# Patient Record
Sex: FEMALE | Race: WHITE | Employment: OTHER | URBAN - METROPOLITAN AREA
[De-identification: names, ages, dates, MRNs, and addresses within clinical notes are randomized per-mention and may not be internally consistent; named-entity substitution may affect disease eponyms.]

---

## 2017-01-26 ENCOUNTER — OFFICE VISIT (OUTPATIENT)
Dept: PSYCHIATRY | Facility: CLINIC | Age: 72
End: 2017-01-26
Attending: CLINICAL NURSE SPECIALIST
Payer: MEDICARE

## 2017-01-26 VITALS — DIASTOLIC BLOOD PRESSURE: 76 MMHG | SYSTOLIC BLOOD PRESSURE: 122 MMHG | HEART RATE: 80 BPM

## 2017-01-26 DIAGNOSIS — F40.01 AGORAPHOBIA WITH PANIC DISORDER: Primary | ICD-10-CM

## 2017-01-26 DIAGNOSIS — F41.1 GAD (GENERALIZED ANXIETY DISORDER): ICD-10-CM

## 2017-01-26 PROCEDURE — 99212 OFFICE O/P EST SF 10 MIN: CPT | Mod: 25,ZF

## 2017-01-26 RX ORDER — CLONAZEPAM 0.5 MG/1
TABLET ORAL
Qty: 45 TABLET | Refills: 0
Start: 2017-01-26 | End: 2017-10-19

## 2017-01-26 RX ORDER — LOSARTAN POTASSIUM 25 MG/1
25 TABLET ORAL
COMMUNITY
Start: 2016-12-01 | End: 2017-08-10

## 2017-01-26 RX ORDER — ESCITALOPRAM OXALATE 10 MG/1
10 TABLET ORAL DAILY
Qty: 90 TABLET | Refills: 0 | Status: SHIPPED | OUTPATIENT
Start: 2017-01-26 | End: 2017-05-15

## 2017-01-26 NOTE — MR AVS SNAPSHOT
After Visit Summary   1/26/2017    Sai Monroe    MRN: [id number]           Patient Information     Date Of Birth          1945        Visit Information        Provider Department      1/26/2017 3:45 PM Patience Walters APRN CNS [facility]        Today's Diagnoses     Agoraphobia with panic disorder    -  1     TRUDY (generalized anxiety disorder)           Care Instructions    PTSD/Trauma Clinicians          Trauma Therapists Previously with the CVT:      CELI Schwartz                     *Adults (18+)  [address]  [place], [place] [address]  Phone: [phone]    CELI Ryder                     *Adults (18+), Elderly  [address], [address]  [place]  [address]  Phone: [phone]    Sparkle Miles, PhD,     [address] [place] [address]  Phone: [phone]    Other Trauma Referrals:    Micaela Scherer, PhD, [facility]          *Adults  [facility]  [address] [place] [address] [phone]  [email or web address]    [facility]       *Children, Adolescents, Adults  [place], Phone: [phone]  [place], Phone: [phone]   [place], Phone: [phone]  [facility]], Phone: [phone]  [facility], Phone: [phone]    [facility]                     *Children, Adolescents, Adults  [address], [address]  [place], [place], [address]  Phone: [phone]    Cyndy Myers, PhD, [place], [place]  [address] the intersection of [address]  Phone: [phone] ext [phone]    [facility] [facility]                       *Adults (18+)  [address]  [place] [address]  Phone: [phone]  CELI Lofton specializes in EMDR and trauma focused CBT.   There also are practitioners who offer Prolonged Exposure and Somatic Experiencing   to help address trauma related issues.     ** [place] [facility]                 *Children, Adolescents,  Adults  [facility]   [address], [address]  [place] [address]  Phone: [phone] ext [phone]  Kelsi Juarez PsyD, LP,   Charo Lamas PsyD, ADRIENNE Garcia MA, ADRIENNE offer Prolonged Exposure.     [facility]  [address] [place] [address], [address]           *Children, Adolescents, [place] [address]  Phone: [phone] / [phone]  Anyone there, including:  Faustina Gutierrez              Follow-ups after your visit        Follow-up notes from your care team     Return in about 6 months (around 7/26/2017).      Your next 10 appointments already scheduled     Jul 26, 2017 10:15 AM   Adult Med Follow UP with JAYJAY Shook CNS   Psychiatry Clinic ([facility])    [place] [id number] [address] [phone]              Who to contact     Please call your clinic at [phone] to:    Ask questions about your health    Make or cancel appointments    Discuss your medicines    Learn about your test results    Speak to your doctor   If you have compliments or concerns about an experience at your clinic, or if you wish to file a complaint, please contact [facility] Patient Relations at [phone] or email us at [email or web address]         Additional Information About Your Visit        [employer] Information     [employer] gives you secure access to your electronic health record. If you see a primary care provider, you can also send messages to your care team and make appointments. If you have questions, please call your primary care clinic.  If you do not have a primary care provider, please call [phone] and they will assist you.      [employer] is an electronic gateway that provides easy, online access to your medical records. With [employer], you can request a clinic appointment, read your test results, renew a prescription or communicate with your care  team.     To access your existing account, please contact your [facility] or call [phone] for assistance.        Care EveryWhere ID     This is your Care EveryWhere ID. This could be used by other organizations to access your [facility] medical records  [id number]        Your Vitals Were     Pulse                   80            Blood Pressure from Last 3 Encounters:   01/26/17 122/76   06/22/16 121/67   12/30/15 122/73    Weight from Last 3 Encounters:   04/27/05 86.637 kg (191 lb)   04/20/05 86.637 kg (191 lb)   02/04/05 87.363 kg (192 lb 9.6 oz)              Today, you had the following     No orders found for display         Where to get your medicines      These medications were sent to [facility] - [place], [place] - [address]  [address] [facility] [facility] [address]     Phone:  [phone]    - escitalopram 10 MG tablet      Some of these will need a paper prescription and others can be bought over the counter.  Ask your nurse if you have questions.     You don't need a prescription for these medications    - clonazePAM 0.5 MG tablet       Primary Care Provider Office Phone # Fax #    Stephan Lamarriccardocleo [phone]       [facility]  [facility] [address]        Thank you!     Thank you for choosing PSYCHIATRY CLINIC  for your care. Our goal is always to provide you with excellent care. Hearing back from our patients is one way we can continue to improve our services. Please take a few minutes to complete the written survey that you may receive in the mail after your visit with us. Thank you!             Your Updated Medication List - Protect others around you: Learn how to safely use, store and throw away your medicines at www.disposemymeds.org.          This list is accurate as of: 1/26/17  4:38 PM.  Always use your most recent med list.                   Brand Name Dispense Instructions for  use    calcium carbonate 500 MG tablet    OS-MAXIMUS 500 mg [place]. [place]     Take 500 mg by mouth 2 times daily       clonazePAM 0.5 MG tablet    klonoPIN    45 tablet    Take 1/2 tab by mouth daily as needed for anxiety.       escitalopram 10 MG tablet    LEXAPRO    90 tablet    Take 1 tablet (10 mg) by mouth daily       Fish Oil 500 MG Caps      Take 3 capsules (1,500 mg) by mouth daily       glucosamine-chondroitin 500-400 MG Caps per capsule      Take 1 capsule by mouth daily       losartan 25 MG tablet    COZAAR     Take 25 mg by mouth       MELATONIN PO      Take 5 mg by mouth       Multi-vitamin Tabs tablet      Take 1 tablet by mouth daily       VITAMIN D3 PO      Take 1,000 Units by mouth daily       ZOCOR 10 MG tablet   Generic drug:  simvastatin      Take 20 mg by mouth daily

## 2017-01-26 NOTE — NURSING NOTE
Chief Complaint   Patient presents with     RECHECK     Agoraphobia with panic disorder     Reviewed allergies, smoking status, and pharmacy preference  Administered abuse screening questions   Obtained blood pressure and heart rate,declined weight.  Liz Caro LPN

## 2017-01-26 NOTE — PROGRESS NOTES
Outpatient Psychiatry Progress Note     Provider: JAYJAY Shook CNS  Date: 2017  Service:  Medication follow up with counseling.   Patient Identification: Sia Monroe  : 1945   MRN: [id number]    Sia Monroe is a 71 year old year old female who presents for ongoing psychiatric care.  Sia Monroe was last seen in clinic on 16.   At that time,   Assessment & Plan       Sia Monroe is seen today for follow up and reports her mood has been stable. Has had stress with job changes but happy with what she is doing now.    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  She likes her job there.   Her mood has been stable. Has not had full panic but has times of body sensations of panic.  Hasn't been able to look into therapy.   Thinking about turning to 72 and what she wants to do as far as work.  Would like to consider traveling.  Panic is around travel which started at age 28 when she joined the .  Her dog  in July which was difficult. Her dog was comforting when she travelled.  Side effects of medication include: vivid dreams  Psychiatric Review of Systems:  The patient endorses symptoms of depression: In the last 2 weeks denies  She  patient endorses symptoms of anxiety : stable as long as stays in her comfort zone - see subjective.  She endorses symptoms of damaso including none.    She endorses symptoms of psychosis including no psychotic symptoms.       Review of Medical Systems:  Sleep: no  concerns  Energy: stable  Concentration: stable  Appetite: stable  GI Concerns: none  Cardiac concerns: none  Neurological concerns: none  Other medical concerns: no new concerns  Current Substance Use:  Alcohol:denies  Other drugs:denies  Caffeine:not discussed  Nicotine: none  Past Medical History:   Past Medical History   Diagnosis Date     Depressive disorder, not elsewhere classified      Headache(784.0)      Patient Active Problem List   Diagnosis     Agoraphobia with panic disorder     TRUDY (generalized anxiety disorder)       Allergies:   Allergies   Allergen Reactions     No Known Drug Allergies      Perfume      Latex Rash          Current Medications     Current Outpatient Prescriptions Ordered in [facility]   Medication Sig Dispense Refill     escitalopram (LEXAPRO) 10 MG tablet Take 1 tablet (10 mg) by mouth daily 90 tablet 0     clonazePAM (KLONOPIN) 0.5 MG tablet Take 1/2 tab by mouth daily as needed for anxiety. 45 tablet 0     UNKNOWN MED DOSAGE Bitter Kate 900mg       Omega-3 Fatty Acids (FISH OIL) 500 MG CAPS Take 3 capsules (1,500 mg) by mouth daily       multivitamin, therapeutic with minerals (MULTI-VITAMIN) TABS Take 1 tablet by mouth daily       MELATONIN PO Take 5 mg by mouth       calcium carbonate (OS-MAXIMUS 500 MG [place]. [place]) 500 MG tablet Take 500 mg by mouth 2 times daily       Cholecalciferol (VITAMIN D3 PO) Take 1,000 Units by mouth daily       glucosamine-chondroitin 500-400 MG CAPS Take 1 capsule by mouth daily       hydrochlorothiazide 12.5 MG TABS Take 25 mg by mouth daily       simvastatin (ZOCOR) 10 MG tablet Take 20 mg by mouth daily       No current [facility]-ordered facility-administered medications on file.        Mental Status Exam     Appearance:  Casually dressed and Well groomed  Behavior/relationship to examiner/demeanor:  Cooperative  Orientation: Oriented to person, place, time and situation  Psychomotor: normal form  Speech Rate:  Normal  Speech Spontaneity:  Normal  Mood:   "\"okay\"  Affect:  Appropriate/mood-congruent  Thought Process (Associations):  Goal directed  Thought Content:  no overt psychosis, denies suicidal ideation, intent or thoughts and patient does not appear to be responding to internal stimuli  Abnormal Perception:  None  Attention/Concentration:  Normal  Language:  Intact  Insight:  Good  Judgment:  Good      Results     Vital signs: /76 mmHg  Pulse 80    Laboratory Data:  no new data    Assessment & Plan      Sia Monroe is seen today for follow up and reports her mood has been stable and would like to continue current medications. Reports that she continues to have fear of traveling which started when after she had been in the  and had panic attacks during training. Discussed considering further therapy or Augmentin with medication since when on Effexor she was able to travel easier. Also had been able to travel with her pet dog that has passed away.    Diagnosis  Axis 1: TRUDY, Agoraphobia with panic  Axis 2: none  Axis 3: See problem list in the medical record    Plan:  Medication: Continue current medications  OTC Recommendations: none  Lab Orders:  none  Referrals: none  Release of Information: none  Future Treatment Considerations: augmentation due to fear of traveling  Return for Follow Up: 6 months   The risks, benefits, alternatives and side effects have been discussed and are understood by the patient. The patient understands the risks of using street drugs or alcohol. There are no medical contraindications, the patient agrees to treatment, and has the capacity to do so. The patient understands to call 911 or come to the nearest ED if life threatening or urgent symptoms present.  Over 50% of this time was spent counseling the patient and/or coordinating care regarding review of social and occupational functioning.  In addition patient was counseled on health and wellness practices to augment medication treatment of symptoms. See note for " details.    Patience Walters, APRN CNS 1/26/2017

## 2017-01-26 NOTE — PATIENT INSTRUCTIONS
PTSD/Trauma Clinicians          Trauma Therapists Previously with the CVT:      REBECCA SchwartzSW                     *Adults (18+)  [address]  [place], [place] [address]  Phone: [phone]    Mirlande oMra [facility]                     *Adults (18+), Elderly  [address]  [place]  [address]  Phone: [phone]    Sparkle Miles, PhD,     [address] [place] [address]  Phone: [phone]    Other Trauma Referrals:    Micaela Scherer, PhD, [facility], LP          *Adults  [facility]  [address] [place], [place] [address] [phone]  [email or web address]    [facility]       *Children, Adolescents, Adults  [place], Phone: [phone]  [place], Phone: [phone]   [place], Phone: [phone]  [facility], Phone: [phone]  [facility], Phone: [phone]    [facility]                     *Children, Adolescents, Adults  [address], [address]  [place], [place], [address]  Phone: [phone]    Cyndy Myers, PhD, [place], [place]  [address] the intersection of [address]  Phone: [phone] ext [phone]    [facility]                       *Adults (18+)  [address]  [place] [address]  Phone: [phone]  Helene Matias [facility] specializes in EMDR and trauma focused CBT.   There also are practitioners who offer Prolonged Exposure and Somatic Experiencing   to help address trauma related issues.     ** [place] [facility]                 *Children, Adolescents, Adults  [facility]   [address]  [place] [address]  Phone: [phone] ext [phone]  Kelsi Juarez PsyD, ADRIENNE,   Charo Lamas PsyD, ADRIENNE Garcia MA, LP offer Prolonged Exposure.     [facility]  [address]           *Children, Adolescents, [place] [address]  Phone: [phone] / [phone]  Anyone there,  including:  Faustina Gutierrez

## 2017-01-27 NOTE — TELEPHONE ENCOUNTER
Per request of Patience Walters, CNS, APRN a prescription for Klonopin is phoned to [facility] on [address] as outlined in med tab.  Refill information left on pharmacy vm as they are currently closed.

## 2017-05-15 DIAGNOSIS — F40.01 AGORAPHOBIA WITH PANIC DISORDER: ICD-10-CM

## 2017-05-15 RX ORDER — ESCITALOPRAM OXALATE 10 MG/1
10 TABLET ORAL DAILY
Qty: 90 TABLET | Refills: 0 | Status: SHIPPED | OUTPATIENT
Start: 2017-05-15 | End: 2017-08-10

## 2017-05-15 NOTE — TELEPHONE ENCOUNTER
Medication Requested: Lexapro 10 mg tabs  Last Written RX Date: 1-26-17  Last Pharmacy Fill Date: 2-9-17  Last RX Quantity: 90,   # refills: 0    Last Office Visit: 1-26-17  Recommended RTC: 6 mo  Next Scheduled Office Visit: 7-26-17    Since last Visit: # of CX 0 ,# of NOS 0    Last Visit Recommendations for:  Medications: continue  Labs: 0    Will refill 90 day supply per protocol.      Kathleen M Doege RN

## 2017-07-01 ENCOUNTER — HEALTH MAINTENANCE LETTER (OUTPATIENT)
Age: 72
End: 2017-07-01

## 2017-08-10 ENCOUNTER — OFFICE VISIT (OUTPATIENT)
Dept: PSYCHIATRY | Facility: CLINIC | Age: 72
End: 2017-08-10
Attending: CLINICAL NURSE SPECIALIST
Payer: MEDICARE

## 2017-08-10 VITALS — HEART RATE: 70 BPM | SYSTOLIC BLOOD PRESSURE: 127 MMHG | DIASTOLIC BLOOD PRESSURE: 76 MMHG

## 2017-08-10 DIAGNOSIS — F41.1 GAD (GENERALIZED ANXIETY DISORDER): ICD-10-CM

## 2017-08-10 DIAGNOSIS — F40.01 AGORAPHOBIA WITH PANIC DISORDER: Primary | ICD-10-CM

## 2017-08-10 PROCEDURE — 99212 OFFICE O/P EST SF 10 MIN: CPT | Mod: ZF

## 2017-08-10 RX ORDER — PHENOL 1.4 %
10 AEROSOL, SPRAY (ML) MUCOUS MEMBRANE AT BEDTIME
COMMUNITY
Start: 2017-08-10

## 2017-08-10 RX ORDER — SIMVASTATIN 20 MG
20 TABLET ORAL
COMMUNITY
Start: 2017-08-07

## 2017-08-10 RX ORDER — ESCITALOPRAM OXALATE 10 MG/1
10 TABLET ORAL DAILY
Qty: 90 TABLET | Refills: 1 | Status: SHIPPED | OUTPATIENT
Start: 2017-08-10 | End: 2018-02-08

## 2017-08-10 RX ORDER — NITROFURANTOIN 25; 75 MG/1; MG/1
100 CAPSULE ORAL
COMMUNITY
Start: 2017-08-09 | End: 2017-08-16

## 2017-08-10 RX ORDER — ASPIRIN 81 MG/1
81 TABLET ORAL
COMMUNITY
Start: 2015-11-25

## 2017-08-10 RX ORDER — CHOLECALCIFEROL (VITAMIN D3) 50 MCG
2000 TABLET ORAL DAILY
COMMUNITY
Start: 2017-08-10

## 2017-08-10 RX ORDER — TRAMADOL HYDROCHLORIDE 50 MG/1
50 TABLET ORAL
COMMUNITY
Start: 2017-05-02

## 2017-08-10 RX ORDER — LISINOPRIL 5 MG/1
5 TABLET ORAL
COMMUNITY
Start: 2016-02-10

## 2017-08-10 RX ORDER — ACETAMINOPHEN 500 MG
1000 TABLET ORAL
COMMUNITY
Start: 2017-05-02

## 2017-08-10 NOTE — PROGRESS NOTES
Outpatient Psychiatry Progress Note     Provider: JAYJAY Shook CNS  Date: 8/10/2017  Service:  Medication follow up with counseling.   Patient Identification: Sia Monroe  : 1945   MRN: [id number]    Sia Monroe is a 71 year old year old female who presents for ongoing psychiatric care.  Sia Monroe was last seen in clinic on 17.   At that time,   Assessment & Plan       Sia Monroe is seen today for follow up and reports her mood has been stable and would like to continue current medications. Reports that she continues to have fear of traveling which started when after she had been in the  and had panic attacks during training. Discussed considering further therapy or Augmentin with medication since when on Effexor she was able to travel easier. Also had been able to travel with her pet dog that has passed away.     Diagnosis  Axis 1: TRUDY, Agoraphobia with panic  Axis 2: none  Axis 3: See problem list in the medical record     Plan:  Medication: Continue current medications  OTC Recommendations: none  Lab Orders:  none  Referrals: none  Release of Information: none  Future Treatment Considerations: augmentation due to fear of traveling  Return for Follow Up: 6 months    ____________________________________________________________________________________________________________________________________________    08/10/2017  Today Sia reports she has been ill, had a UTI 4 weeks ago, then came down with sore throat, achey, fever and ended up finding out after 3 weeks that she had another UTI.  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   Race walks for exercise  Side effects of medication include: none reported  Psychiatric Review of Systems:  The patient endorses symptoms of depression: In the last 2 weeks per PHQ 9 score of zero.  She  patient endorses symptoms of anxiety : stable rarely taking Klonopin  She endorses symptoms  of damaso including none.    She endorses symptoms of psychosis including no psychotic symptoms.       Review of Medical Systems:  Sleep: stable  Energy: stable  Concentration: stable  Appetite: stable  GI Concerns: none  Cardiac concerns: none  Neurological concerns: none  Other medical concerns: no new concerns  Current Substance Use:  Alcohol:denies frequent use or abuse  Other drugs:none  Caffeine:no change  Nicotine: not reviewed  Past Medical History:   Past Medical History:   Diagnosis Date     Depressive disorder, not elsewhere classified      Headache(784.0)      Patient Active Problem List   Diagnosis     Agoraphobia with panic disorder     TRUDY (generalized anxiety disorder)       Allergies:   Allergies   Allergen Reactions     No Known Drug Allergies      Perfume      Latex Rash          Current Medications     Current Outpatient Prescriptions Ordered in [facility]   Medication Sig Dispense Refill     escitalopram (LEXAPRO) 10 MG tablet Take 1 tablet (10 mg) by mouth daily 90 tablet 0     losartan (COZAAR) 25 MG tablet Take 25 mg by mouth       clonazePAM (KLONOPIN) 0.5 MG tablet Take 1/2 tab by mouth daily as needed for anxiety. 45 tablet 0     Omega-3 Fatty Acids (FISH OIL) 500 MG CAPS Take 3 capsules (1,500 mg) by mouth daily       multivitamin, therapeutic with minerals (MULTI-VITAMIN) TABS Take 1 tablet by mouth daily       MELATONIN PO Take 5 mg by mouth       calcium carbonate (OS-MAXIMUS 500 MG [place]. [place]) 500 MG tablet Take 500 mg by mouth 2 times daily       Cholecalciferol (VITAMIN D3 PO) Take 1,000 Units by mouth daily       glucosamine-chondroitin 500-400 MG CAPS Take 1 capsule by mouth daily       simvastatin (ZOCOR) 10 MG tablet Take 20 mg by mouth daily       No current [facility]-ordered facility-administered medications on file.         Mental Status Exam     Appearance:  Casually dressed and Well groomed  Behavior/relationship to examiner/demeanor:  Cooperative  Orientation: Oriented to person, place,  "time and situation  Psychomotor: normal form  Speech Rate:  Normal  Speech Spontaneity:  Normal  Mood:  \"good\"  Affect:  Appropriate/mood-congruent  Thought Process (Associations):  Goal directed  Thought Content:  no overt psychosis, denies suicidal ideation, intent or thoughts and patient does not appear to be responding to internal stimuli  Abnormal Perception:  None  Attention/Concentration:  Normal  Language:  Intact  Insight:  Good  Judgment:  Good      Results     Vital signs: /76  Pulse 70    Laboratory Data:  no new data    Assessment & Plan      Sia Monroe is seen today for follow up and reports overall her mood has been stable and she would like to continue current medications.    Diagnosis  Axis 1: Panic with Agoraphobia, TRUDY  Axis 2: none  Axis 3: See problem list in the medical record    Plan:  Medication: Continue Lexapro 10mg, prn Klonopin  OTC Recommendations: none  Lab Orders:  none  Referrals: none  Release of Information: none  Future Treatment Considerations:per symptoms  Return for Follow Up:6 months   The risks, benefits, alternatives and side effects have been discussed and are understood by the patient. The patient understands the risks of using street drugs or alcohol. There are no medical contraindications, the patient agrees to treatment, and has the capacity to do so. The patient understands to call 911 or come to the nearest ED if life threatening or urgent symptoms present.  Over 50% of this time was spent counseling the patient and/or coordinating care regarding review of social and occupational functioning.  In addition patient was counseled on health and wellness practices to augment medication treatment of symptoms. See note for details.    Patience Walters, JAYJAY CNS 8/10/2017   "

## 2017-08-10 NOTE — NURSING NOTE
Chief Complaint   Patient presents with     Recheck Medication     Agoraphobia with panic disorder      Reviewed allergies, smoking status, and pharmacy preference  Administered abuse screening question   Obtained weight, blood pressure and heart rate

## 2017-08-10 NOTE — MR AVS SNAPSHOT
After Visit Summary   8/10/2017    Sia Monroe    MRN: [id number]           Patient Information     Date Of Birth          1945        Visit Information        Provider Department      8/10/2017 8:45 AM Patience Walters APRN [facility]        Today's Diagnoses     Agoraphobia with panic disorder    -  1    TRUDY (generalized anxiety disorder)           Follow-ups after your visit        Follow-up notes from your care team     Return in about 6 months (around 2/10/2018).      Your next 10 appointments already scheduled     Feb 08, 2018  8:45 AM CST   Adult Med Follow UP with JAYJAY Shook CNS   Psychiatry Clinic ([facility])    [address] [facility] [address] [phone]              Who to contact     Please call your clinic at [phone] to:    Ask questions about your health    Make or cancel appointments    Discuss your medicines    Learn about your test results    Speak to your doctor   If you have compliments or concerns about an experience at your clinic, or if you wish to file a complaint, please contact [facility] Patient Relations at [phone] or email us at [email or web address]         Additional Information About Your Visit        MyChart Information     [email or web address] gives you secure access to your electronic health record. If you see a primary care provider, you can also send messages to your care team and make appointments. If you have questions, please call your primary care clinic.  If you do not have a primary care provider, please call [phone] and they will assist you.      [email or web address] is an electronic gateway that provides easy, online access to your medical records. With [email or web address], you can request a clinic appointment, read your test results, renew a prescription or communicate with your care team.     To access your existing account, please  contact your [facility] or call [phone] for assistance.        Care EveryWhere ID     This is your Care EveryWhere ID. This could be used by other organizations to access your [place] medical records  [id number]        Your Vitals Were     Pulse                   70            Blood Pressure from Last 3 Encounters:   08/10/17 127/76   01/26/17 122/76   06/22/16 121/67    Weight from Last 3 Encounters:   04/27/05 86.6 kg (191 lb)   04/20/05 86.6 kg (191 lb)   02/04/05 87.4 kg (192 lb 9.6 oz)              Today, you had the following     No orders found for display         Today's Medication Changes          These changes are accurate as of: 8/10/17 11:59 PM.  If you have any questions, ask your nurse or doctor.               These medicines have changed or have updated prescriptions.        Dose/Directions    * cholecalciferol 1000 UNIT tablet   Commonly known as:  vitamin D   This may have changed:  Another medication with the same name was changed. Make sure you understand how and when to take each.   Changed by:  Patience Walters APRN CNS        Refills:  0       * Vitamin D3 2000 UNITS Tabs   This may have changed:    - medication strength  - how much to take   Changed by:  Patience Walters APRN CNS        Dose:  2000 Units   Take 2,000 Units by mouth daily   Refills:  0       Melatonin 10 MG Tabs tablet   This may have changed:    - medication strength  - how much to take  - when to take this   Changed by:  Patience Walters APRN CNS        Dose:  10 mg   Take 1 tablet (10 mg) by mouth At Bedtime   Refills:  0       * Notice:  This list has 2 medication(s) that are the same as other medications prescribed for you. Read the directions carefully, and ask your doctor or other care provider to review them with you.         Where to get your medicines      These medications were sent to [facility] - [place], [place] - [address]    [address] [address]     Phone:  [phone]     escitalopram 10 MG tablet                Primary Care Provider Office Phone # Fax #    Stephan Voss [phone]       [facility]  [address]  [facility] [address]        Equal Access to Services     KELSEY MIRELES : Hadii aad ku hadasho Soomaali, waaxda luqadaha, qaybta kaalmada adeegyada, waxay idiin hayaan adeeg khkarolsh larosi . So [facility] [phone].    ATENCIÓN: Si habla español, tiene a barragan disposición servicios gratuitos de asistencia lingüística. Kayla al [phone].    We comply with applicable federal civil rights laws and [place] laws. We do not discriminate on the basis of race, color, national origin, age, disability sex, sexual orientation or gender identity.            Thank you!     Thank you for choosing PSY[facility]ATRY CLINIC  for your care. Our goal is always to provide you with excellent care. Hearing back from our patients is one way we can continue to improve our services. Please take a few minutes to complete the written survey that you may receive in the mail after your visit with us. Thank you!             Your Updated Medication List - Protect others around you: Learn how to safely use, store and throw away your medicines at www.disposemymeds.org.          This list is accurate as of: 8/10/17 11:59 PM.  Always use your most recent med list.                   Brand Name Dispense Instructions for use Diagnosis    acetaminophen 500 MG tablet    TYLENOL     Take 1,000 mg by mouth        aspirin EC 81 MG EC tablet      Take 81 mg by mouth        calcium carbonate 1250 MG tablet    OS-MAXIMUS 500 mg [place]. Ca     Take 500 mg by mouth 2 times daily        * cholecalciferol 1000 UNIT tablet    vitamin D          * Vitamin D3 2000 UNITS Tabs      Take 2,000 Units by mouth daily        clonazePAM 0.5 MG tablet    klonoPIN    45 tablet    Take 1/2 tab by mouth daily as needed for anxiety.     Agoraphobia with panic disorder, TRUDY (generalized anxiety disorder)       escitalopram 10 MG tablet    LEXAPRO    90 tablet    Take 1 tablet (10 mg) by mouth daily    Agoraphobia with panic disorder       Fish Oil 500 MG Caps      Take 3 capsules (1,500 mg) by mouth daily        glucosamine-chondroitin 500-400 MG Caps per capsule      Take 1 capsule by mouth daily        lisinopril 5 MG tablet    PRINIVIL/ZESTRIL     Take 5 mg by mouth        Melatonin 10 MG Tabs tablet      Take 1 tablet (10 mg) by mouth At Bedtime        Multi-vitamin Tabs tablet      Take 1 tablet by mouth daily        nitroFURantoin (macrocrystal-monohydrate) 100 MG capsule    MACROBID     Take 100 mg by mouth        simvastatin 20 MG tablet    ZOCOR     Take 20 mg by mouth        traMADol 50 MG tablet    ULTRAM     Take 50 mg by mouth        * Notice:  This list has 2 medication(s) that are the same as other medications prescribed for you. Read the directions carefully, and ask your doctor or other care provider to review them with you.

## 2017-10-19 DIAGNOSIS — F41.1 GAD (GENERALIZED ANXIETY DISORDER): ICD-10-CM

## 2017-10-19 DIAGNOSIS — F40.01 AGORAPHOBIA WITH PANIC DISORDER: ICD-10-CM

## 2017-10-19 RX ORDER — CLONAZEPAM 0.5 MG/1
TABLET ORAL
Qty: 45 TABLET | Refills: 0
Start: 2017-10-19 | End: 2018-08-27

## 2018-02-08 ENCOUNTER — OFFICE VISIT (OUTPATIENT)
Dept: PSYCHIATRY | Facility: CLINIC | Age: 73
End: 2018-02-08
Attending: CLINICAL NURSE SPECIALIST
Payer: MEDICARE

## 2018-02-08 VITALS — SYSTOLIC BLOOD PRESSURE: 132 MMHG | DIASTOLIC BLOOD PRESSURE: 82 MMHG | HEART RATE: 73 BPM

## 2018-02-08 DIAGNOSIS — F40.01 AGORAPHOBIA WITH PANIC DISORDER: ICD-10-CM

## 2018-02-08 DIAGNOSIS — F41.1 GAD (GENERALIZED ANXIETY DISORDER): Primary | ICD-10-CM

## 2018-02-08 PROCEDURE — G0463 HOSPITAL OUTPT CLINIC VISIT: HCPCS | Mod: ZF

## 2018-02-08 RX ORDER — ESCITALOPRAM OXALATE 10 MG/1
10 TABLET ORAL DAILY
Qty: 90 TABLET | Refills: 1 | Status: SHIPPED | OUTPATIENT
Start: 2018-02-08 | End: 2018-08-09

## 2018-02-08 ASSESSMENT — PAIN SCALES - GENERAL: PAINLEVEL: MODERATE PAIN (4)

## 2018-02-08 NOTE — PROGRESS NOTES
Outpatient Psychiatry Progress Note     Provider: JAYJAY Shook CNS  Date: 2018  Service:  Medication follow up with counseling.   Patient Identification: Sia Monroe  : 1945   MRN: [id number]    Sia Monroe is a 72 year old year old female who presents for ongoing psychiatric care.  Sia Monroe was last seen in clinic on 8/10/17.   At that time,   Assessment & Plan       Sia Monroe is seen today for follow up and reports overall her mood has been stable and she would like to continue current medications.     Diagnosis  Axis 1: Panic with Agoraphobia, TRUDY  Axis 2: none  Axis 3: See problem list in the medical record     Plan:  Medication: Continue Lexapro 10mg, prn Klonopin  OTC Recommendations: none  Lab Orders:  none  Referrals: none  Release of Information: none  Future Treatment Considerations:per symptoms  Return for Follow Up:6 months      ____________________________________________________________________________________________________________________________________________    2018  Today Sia reports she has been having vivid dreams since she has been on since Lexapro.   It does help with anxiety. She is currently under stress with a women who lives with her that has been homeless for 3 years which is why she is leaving with her.  Sia also has a housemate who is stressed by this women.   Side effects of medication include: vivid dreams.  Psychiatric Review of Systems:  Depression: In the last 2 weeks per PHQ 9 score of zero  Anxiety : see above. Doesn't use Klonopin for anxiety only uses for sleep prn  Nova none   Psychosis  none.   ADHD n/a    Review of Medical Systems:  Sleep: stable uses Klonopin only 1/2 tablet a few times a week when she wakes up during the night.  Energy: stable  Concentration: stable  Appetite: stable  GI Concerns: none  Cardiac concerns: none  Neurological concerns: no new concerns  Other medical concerns: no new  concerns  Current Substance Use:  Alcohol:denies  Other drugs:denies  Caffeine:no change  Nicotine: no change - 2 cigs per day  Past Medical History:   Past Medical History:   Diagnosis Date     Depressive disorder, not elsewhere classified      Headache(784.0)      Patient Active Problem List   Diagnosis     Agoraphobia with panic disorder     TRUDY (generalized anxiety disorder)       Allergies:   Allergies   Allergen Reactions     No Known Drug Allergies      Perfume      Latex Rash          Current Medications     Current Outpatient Prescriptions Ordered in [facility]   Medication Sig Dispense Refill     clonazePAM (KLONOPIN) 0.5 MG tablet Take 1/2 tab by mouth daily as needed for anxiety. 45 tablet 0     acetaminophen (TYLENOL) 500 MG tablet Take 1,000 mg by mouth       aspirin EC 81 MG EC tablet Take 81 mg by mouth       lisinopril (PRINIVIL/ZESTRIL) 5 MG tablet Take 5 mg by mouth       traMADol (ULTRAM) 50 MG tablet Take 50 mg by mouth       simvastatin (ZOCOR) 20 MG tablet Take 20 mg by mouth       cholecalciferol (VITAMIN D3) 1000 UNIT tablet        Cholecalciferol (VITAMIN D3) 2000 UNITS TABS Take 2,000 Units by mouth daily       Melatonin 10 MG TABS tablet Take 1 tablet (10 mg) by mouth At Bedtime       escitalopram (LEXAPRO) 10 MG tablet Take 1 tablet (10 mg) by mouth daily 90 tablet 1     Omega-3 Fatty Acids (FISH OIL) 500 MG CAPS Take 3 capsules (1,500 mg) by mouth daily       multivitamin, therapeutic with minerals (MULTI-VITAMIN) TABS Take 1 tablet by mouth daily       calcium carbonate (OS-MAXIMUS 500 MG [place]. [place]) 500 MG tablet Take 500 mg by mouth 2 times daily       glucosamine-chondroitin 500-400 MG CAPS Take 1 capsule by mouth daily       No current [facility]-ordered facility-administered medications on file.         Mental Status Exam     Appearance:  Casually dressed and Well groomed  Behavior/relationship to examiner/demeanor:  Cooperative  Orientation: Oriented to person, place, time and  "situation  Psychomotor: normal form  Speech Rate:  Normal  Speech Spontaneity:  Normal  Mood:  \"okay\"  Affect:  Appropriate/mood-congruent  Thought Process (Associations):  Goal directed  Thought Content:  no overt psychosis, denies suicidal ideation, intent or thoughts and patient does not appear to be responding to internal stimuli  Abnormal Perception:  None  Attention/Concentration:  Normal  Insight:  Adequate  Judgment:  Adequate for safety      Results     Vital signs: /82  Pulse 73    Laboratory Data:  no new data review    Assessment & Plan      Sia Monroe is seen today for follow up and reports in general her mood is stable but stressed with circumstances around someone she is trying to help.  Although having vivid dreams secondary to Lexapro since she did not tolerate other medications and is sensitive to medications will continue Lexapro 10mg.    Diagnosis   Panic with Agoraphobia, TRUDY      Plan:  Medication: Continue current medications but consider supplements for waking during the night  OTC Recommendations: L theanine, Agustin Nite, consider decreasing Melatonin to 3  To 5 mg.  Lab Orders:  none  Referrals: none  Release of Information: none  Future Treatment Considerations:per symptoms  Return for Follow Up:6 months   The risks, benefits, alternatives and side effects have been discussed and are understood by the patient. The patient understands the risks of using street drugs or alcohol. There are no medical contraindications, the patient agrees to treatment, and has the capacity to do so. The patient understands to call 911 or come to the nearest ED if life threatening or urgent symptoms present.  In addition time was spent counseling the patient and/or coordinating care regarding review of social and occupational functioning.  In addition patient was counseled on health and wellness practices to augment medication treatment of symptoms. See note for details.    Patience Walters, APRN " CNS 2/8/2018

## 2018-02-08 NOTE — PATIENT INSTRUCTIONS
Medication: Continue current medications but consider supplements for waking during the night  OTC Recommendations: L theanine, Mid Nite, consider decreasing Melatonin to 3  To 5 mg.  L-Theanine 200mg  Dose.  Could consider other sleep supplements you find.  Lab Orders:  none  Referrals: none  Release of Information: none  Future Treatment Considerations:per symptoms  Return for Follow Up:6 months    Thank you for coming to the PSYCHIATRY CLINIC.    Lab Testing:  If you had lab testing today and your results are reassuring or normal they will be mailed to you or sent through [employer] within 7 days.   If the lab tests need quick action we will call you with the results.  The phone number we will call with results is # [phone] (home) . If this is not the best number please call our clinic and change the number.    Medication Refills:  If you need any refills please call your pharmacy and they will contact us. Our fax number for refills is [phone]. Please allow three business for refill processing.   If you need to  your refill at a new pharmacy, please contact the new pharmacy directly. The new pharmacy will help you get your medications transferred.     Scheduling:  If you have any concerns about today's visit or wish to schedule another appointment please call our office during normal business hours [phone] (8-5:00 M-F)    Contact Us:  Please call [phone] during business hours (8-5:00 M-F).  If after clinic hours, or on the weekend, please call  [phone].    Financial Assistance [phone]  [facility] [phone]  [facility] [phone]  Medical Records [phone]      MENTAL HEALTH CRISIS NUMBERS:  [facility]:   [facility] - [phone]   Crisis Residence [facility] - [phone]   Walk-In Counseling Center [facility] - [phone]   COPE 24/7 [facility] Mobile Team for Adults - [[phone]]; Child - [[phone]]     Crisis  [facility] - [phone]     [facility] - [phone]   Walk-in counseling [facility] - [phone]   Walk-in counseling [facility] - [phone]   Crisis Residence [facility] - [phone]   Urgent Care Adult Mental Health:   --Drop-in, 24/7 crisis line, and [place] Mobile Team [[phone]]    CRISIS TEXT LINE: Text  from anywhere, anytime, any crisis 24/7;    OR SEE www.crisistextline.org     Poison Control Center - [phone]    CHILD: [facility] needs assessment team - [phone]     [facility] Lifeline - [phone]; or [facility] Lifeline - 1-811.522.8632    If you have a medical emergency please call 911or go to the nearest ER.                    _____________________________________________    Again thank you for choosing PSYCHIATRY CLINIC and please let us know how we can best partner with you to improve you and your family's health.  You may be receiving a survey in the mail regarding this appointment. We would love to have your feedback, both positive and negative, so please fill out the survey and return it using the provided envolpe. The survey is done by an external company, so your answers are anonymous.

## 2018-02-08 NOTE — MR AVS SNAPSHOT
After Visit Summary   2/8/2018    Sia Monroe    MRN: [id number]           Patient Information     Date Of Birth          1945        Visit Information        Provider Department      2/8/2018 8:45 AM Patience Walters APRN [facility]        Today's Diagnoses     TRUDY (generalized anxiety disorder)    -  1    Agoraphobia with panic disorder          Care Instructions    Medication: Continue current medications but consider supplements for waking during the night  OTC Recommendations: L theanine, Mid Nite, consider decreasing Melatonin to 3  To 5 mg.  L-Theanine 200mg  Dose.  Could consider other sleep supplements you find.  Lab Orders:  none  Referrals: none  Release of Information: none  Future Treatment Considerations:per symptoms  Return for Follow Up:6 months    Thank you for coming to the PSYCHIATRY CLINIC.    Lab Testing:  If you had lab testing today and your results are reassuring or normal they will be mailed to you or sent through [employer] within 7 days.   If the lab tests need quick action we will call you with the results.  The phone number we will call with results is # [phone] (home) . If this is not the best number please call our clinic and change the number.    Medication Refills:  If you need any refills please call your pharmacy and they will contact us. Our fax number for refills is [phone]. Please allow three business for refill processing.   If you need to  your refill at a new pharmacy, please contact the new pharmacy directly. The new pharmacy will help you get your medications transferred.     Scheduling:  If you have any concerns about today's visit or wish to schedule another appointment please call our office during normal business hours [phone] (8-5:00 M-F)    Contact Us:  Please call [phone] during business hours (8-5:00 M-F).  If after clinic hours, or on the weekend, please call  [phone].    Financial Assistance  [phone]  [facility] Billing [phone]  [place] Billing [phone]  Medical Records [phone]      MENTAL HEALTH CRISIS NUMBERS:  [facility]:   [facility] - [phone]   Crisis Residence \[facility]\"" [facility] - [phone]   Walk-In Counseling Center \[facility]\"" - [phone]   COPE 24/7 [place] Mobile Team for Adults - [[phone]]; Child - [[phone]]     Crisis Connection - [phone]     [facility]:   [facility] - [phone]   Walk-in counseling [facility] - [phone]   Walk-in counseling [facility] - [phone]   Crisis Residence [facility] - [phone]   Urgent Care Adult Mental Health:   --Drop-in, 24/7 crisis line, and [place] Mobile Team [[phone]]    CRISIS TEXT LINE: Text [phone] from anywhere, anytime, any crisis 24/7;    OR SEE www.crisistextline.org     Poison Control Center - [phone]    CHILD: [facility] needs assessment team - [phone]     [facility] - [phone]; or [facility] - [phone]    If you have a medical emergency please call 911or go to the nearest ER.                    _____________________________________________    Again thank you for choosing PSYCHIATRY CLINIC and please let us know how we can best partner with you to improve you and your family's health.  You may be receiving a survey in the mail regarding this appointment. We would love to have your feedback, both positive and negative, so please fill out the survey and return it using the provided envolpe. The survey is done by an external company, so your answers are anonymous.             Follow-ups after your visit        Follow-up notes from your care team     Return in about 6 months (around 8/8/2018).      Your next 10 appointments already scheduled     Aug 09, 2018  8:45 AM CDT   Adult Med Follow UP with JAYJAY Shook CNS   Psychiatry Clinic  ([facility])    [address]  [address] [facility] [id number] [phone]              Who to contact     Please call your clinic at [phone] to:    Ask questions about your health    Make or cancel appointments    Discuss your medicines    Learn about your test results    Speak to your doctor   If you have compliments or concerns about an experience at your clinic, or if you wish to file a complaint, please contact [facility] Patient Relations at [phone] or email us at [email or web address]         Additional Information About Your Visit        [employer]eg[employer] Information     [employer] gives you secure access to your electronic health record. If you see a primary care provider, you can also send messages to your care team and make appointments. If you have questions, please call your primary care clinic.  If you do not have a primary care provider, please call [phone] and they will assist you.      [employer] is an electronic gateway that provides easy, online access to your medical records. With [employer], you can request a clinic appointment, read your test results, renew a prescription or communicate with your care team.     To access your existing account, please contact your [facility] or call [phone] for assistance.        Care EveryWhere ID     This is your Care EveryWhere ID. This could be used by other organizations to access your [place] medical records  [id number]        Your Vitals Were     Pulse                   73            Blood Pressure from Last 3 Encounters:   02/08/18 132/82   08/10/17 127/76   01/26/17 122/76    Weight from Last 3 Encounters:   04/27/05 86.6 kg (191 lb)   04/20/05 86.6 kg (191 lb)   02/04/05 87.4 kg (192 lb 9.6 oz)              Today, you had the following     No orders found for display         Where to get your medicines      These medications were  sent to [facility] - [place], [place] - [address] [facility] [address]  [address] [facility] [address], [place] [address]     Phone:  [phone]     escitalopram 10 MG tablet          Primary Care Provider Office Phone # Fax #    Stephan Voss [phone]       LALITA [facility] MEDICAL  [address]  [facility] [address]        Equal Access to Services     KELSEY MIRELES : Hadii aad ku hadasho Soomaali, waaxda luqadaha, qaybta kaalmada adeegyada, waxay idiin hayaan adeeg kharash la'aan . So [facility] [phone].    ATENCIÓN: Si habla español, tiene a barragan disposición servicios gratuitos de asistencia lingüística. [facility] [phone].    We comply with applicable federal civil rights laws and [place] laws. We do not discriminate on the basis of race, color, national origin, age, disability, sex, sexual orientation, or gender identity.            Thank you!     Thank you for choosing PSYCHIATRY CLINIC  for your care. Our goal is always to provide you with excellent care. Hearing back from our patients is one way we can continue to improve our services. Please take a few minutes to complete the written survey that you may receive in the mail after your visit with us. Thank you!             Your Updated Medication List - Protect others around you: Learn how to safely use, store and throw away your medicines at www.disposemymeds.org.          This list is accurate as of 2/8/18  9:49 AM.  Always use your most recent med list.                   Brand Name Dispense Instructions for use Diagnosis    acetaminophen 500 MG tablet    TYLENOL     Take 1,000 mg by mouth        aspirin EC 81 MG EC tablet      Take 81 mg by mouth        calcium carbonate 1250 MG tablet    OS-MAXIMUS 500 mg [place]. Ca     Take 500 mg by mouth 2 times daily        * cholecalciferol 1000 UNIT tablet    vitamin D3          * Vitamin D3 2000 UNITS Tabs      Take 2,000 Units by mouth daily        clonazePAM 0.5 MG tablet     klonoPIN    45 tablet    Take 1/2 tab by mouth daily as needed for anxiety.    Agoraphobia with panic disorder, TRUDY (generalized anxiety disorder)       escitalopram 10 MG tablet    LEXAPRO    90 tablet    Take 1 tablet (10 mg) by mouth daily    Agoraphobia with panic disorder       Fish Oil 500 MG Caps      Take 3 capsules (1,500 mg) by mouth daily        glucosamine-chondroitin 500-400 MG Caps per capsule      Take 1 capsule by mouth daily        lisinopril 5 MG tablet    PRINIVIL/ZESTRIL     Take 5 mg by mouth        Melatonin 10 MG Tabs tablet      Take 1 tablet (10 mg) by mouth At Bedtime        Multi-vitamin Tabs tablet      Take 1 tablet by mouth daily        simvastatin 20 MG tablet    ZOCOR     Take 20 mg by mouth        traMADol 50 MG tablet    ULTRAM     Take 50 mg by mouth        * Notice:  This list has 2 medication(s) that are the same as other medications prescribed for you. Read the directions carefully, and ask your doctor or other care provider to review them with you.

## 2018-02-08 NOTE — NURSING NOTE
Chief Complaint   Patient presents with     Recheck Medication     Reviewed allergies, medications, pharmacy and smoking status.    Obtained weight, pain level, blood pressure and heart rate

## 2018-07-07 ENCOUNTER — HEALTH MAINTENANCE LETTER (OUTPATIENT)
Age: 73
End: 2018-07-07

## 2018-08-09 ENCOUNTER — OFFICE VISIT (OUTPATIENT)
Dept: PSYCHIATRY | Facility: CLINIC | Age: 73
End: 2018-08-09
Attending: CLINICAL NURSE SPECIALIST
Payer: MEDICARE

## 2018-08-09 VITALS — HEART RATE: 87 BPM | SYSTOLIC BLOOD PRESSURE: 112 MMHG | DIASTOLIC BLOOD PRESSURE: 61 MMHG

## 2018-08-09 DIAGNOSIS — F41.1 GAD (GENERALIZED ANXIETY DISORDER): Primary | ICD-10-CM

## 2018-08-09 DIAGNOSIS — F40.01 AGORAPHOBIA WITH PANIC DISORDER: ICD-10-CM

## 2018-08-09 PROCEDURE — G0463 HOSPITAL OUTPT CLINIC VISIT: HCPCS | Mod: ZF

## 2018-08-09 RX ORDER — ESCITALOPRAM OXALATE 10 MG/1
10 TABLET ORAL DAILY
Qty: 90 TABLET | Refills: 1 | Status: SHIPPED | OUTPATIENT
Start: 2018-08-09 | End: 2019-02-21

## 2018-08-09 ASSESSMENT — PAIN SCALES - GENERAL: PAINLEVEL: NO PAIN (0)

## 2018-08-09 NOTE — PROGRESS NOTES
Outpatient Psychiatry Progress Note     Provider: JAYJAY Shook CNS  Date: 2018  Service:  Medication follow up with counseling.   Patient Identification: Sia Monroe  : 1945   MRN: [id number]    Sia Monroe is a 72 year old year old female who presents for ongoing psychiatric care.  Sia Monroe was last seen in clinic on 18.   At that time,   Assessment & Plan       Sia Monroe is seen today for follow up and reports in general her mood is stable but stressed with circumstances around someone she is trying to help.  Although having vivid dreams secondary to Lexapro since she did not tolerate other medications and is sensitive to medications will continue Lexapro 10mg.     Diagnosis   Panic with Agoraphobia, TRUDY        Plan:  Medication: Continue current medications but consider supplements for waking during the night  OTC Recommendations: L theanine, Mid Nite, consider decreasing Melatonin to 3  To 5 mg.  Lab Orders:  none  Referrals: none  Release of Information: none  Future Treatment Considerations:per symptoms  Return for Follow Up:6 months      ____________________________________________________________________________________________________________________________________________    2018  Today Sia reports she is feeling good. She felt like she was getting depressed but things that is about trying to figure out what she wants to do. Wants to travel and if not worries that anxiety will also will strong.    The woman who lived with her past away in March passed away so it took a lot of time to clear out the rooms she was using and also had to do all the  arrangements.  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  Trying to meditate but finds this difficult  Side effects of medication include: none  Psychiatric Review of Systems:  Depression: In the last 2 weeks per PHQ 9 score of zero  Anxiety : uses Klonopin once  a week but only for sleep if she wakes up during the night not for anxiety  Nova na   Psychosis  na.   ADHD na    Review of Medical Systems:  Sleep: stable  Energy: stable  Concentration: stable  Appetite: stable  GI Concerns: na  Cardiac concerns: na  Neurological concerns: na  Other medical concerns: na  Current Substance Use:  Alcohol:denies  Other drugs:denies  Caffeine:no change  Nicotine: none  Past Medical History:   Past Medical History:   Diagnosis Date     Depressive disorder, not elsewhere classified      Headache(784.0)      Patient Active Problem List   Diagnosis     Agoraphobia with panic disorder     TRUDY (generalized anxiety disorder)       Allergies:   Allergies   Allergen Reactions     No Known Drug Allergies      Perfume      Latex Rash          Current Medications     Current Outpatient Prescriptions Ordered in [facility]   Medication Sig Dispense Refill     acetaminophen (TYLENOL) 500 MG tablet Take 1,000 mg by mouth       aspirin EC 81 MG EC tablet Take 81 mg by mouth       calcium carbonate (OS-MAXIMUS 500 MG [place]. [place]) 500 MG tablet Take 500 mg by mouth 2 times daily       cholecalciferol (VITAMIN D3) 1000 UNIT tablet        Cholecalciferol (VITAMIN D3) 2000 UNITS TABS Take 2,000 Units by mouth daily       clonazePAM (KLONOPIN) 0.5 MG tablet Take 1/2 tab by mouth daily as needed for anxiety. 45 tablet 0     escitalopram (LEXAPRO) 10 MG tablet Take 1 tablet (10 mg) by mouth daily 90 tablet 1     glucosamine-chondroitin 500-400 MG CAPS Take 1 capsule by mouth daily       lisinopril (PRINIVIL/ZESTRIL) 5 MG tablet Take 5 mg by mouth       Melatonin 10 MG TABS tablet Take 1 tablet (10 mg) by mouth At Bedtime       multivitamin, therapeutic with minerals (MULTI-VITAMIN) TABS Take 1 tablet by mouth daily       Omega-3 Fatty Acids (FISH OIL) 500 MG CAPS Take 3 capsules (1,500 mg) by mouth daily       simvastatin (ZOCOR) 20 MG tablet Take 20 mg by mouth       traMADol (ULTRAM) 50 MG tablet Take 50 mg by mouth    "    No current Epic-ordered facility-administered medications on file.         Mental Status Exam     Appearance:  Casually dressed and Well groomed  Behavior/relationship to examiner/demeanor:  Cooperative  Orientation: Oriented to person, place, time and situation  Psychomotor: normal form  Speech Rate:  Normal  Speech Spontaneity:  Normal  Mood:  \"okay\"  Affect:  Appropriate/mood-congruent  Thought Process (Associations):  Goal directed  Thought Content:  no overt psychosis, denies suicidal ideation, intent or thoughts and patient does not appear to be responding to internal stimuli  Abnormal Perception:  None  Attention/Concentration:  Normal  Insight:  Good  Judgment:  Good      Results     Vital signs: /61  Pulse 87    Laboratory Data:  no new data    Assessment & Plan      Sia Monroe is seen today for follow up and reports her mood has been stable and would to continue current medications.    Diagnosis  Panic with Agoraphobia, TRUDY    Plan:  Medication: no changes  OTC Recommendations: none  Lab Orders:  none  Referrals: none  Release of Information: none  Future Treatment Considerations:per symptoms  Return for Follow Up:6 months   The risks, benefits, alternatives and side effects have been discussed and are understood by the patient. The patient understands the risks of using street drugs or alcohol. There are no medical contraindications, the patient agrees to treatment, and has the capacity to do so. The patient understands to call 911 or come to the nearest ED if life threatening or urgent symptoms present.  In addition time was spent counseling the patient and/or coordinating care regarding review of social and occupational functioning.  In addition patient was counseled on health and wellness practices to augment medication treatment of symptoms. See note for details.    Patience Walters, APRN CNS 8/9/2018   "

## 2018-08-09 NOTE — MR AVS SNAPSHOT
After Visit Summary   8/9/2018    Sia Monroe    MRN: [id number]           Patient Information     Date Of Birth          1945        Visit Information        Provider Department      8/9/2018 8:45 AM Patience Walters APRN [facility] Psychiatry Clinic        Today's Diagnoses     TRUDY (generalized anxiety disorder)    -  1    Agoraphobia with panic disorder           Follow-ups after your visit        Follow-up notes from your care team     Return in about 6 months (around 2/9/2019).      Who to contact     Please call your clinic at [phone] to:    Ask questions about your health    Make or cancel appointments    Discuss your medicines    Learn about your test results    Speak to your doctor            Additional Information About Your Visit        [employer] Information     [employer] gives you secure access to your electronic health record. If you see a primary care provider, you can also send messages to your care team and make appointments. If you have questions, please call your primary care clinic.  If you do not have a primary care provider, please call [phone] and they will assist you.      [employer] is an electronic gateway that provides easy, online access to your medical records. With [employer], you can request a clinic appointment, read your test results, renew a prescription or communicate with your care team.     To access your existing account, please contact your [facility] or call [phone] for assistance.        Care EveryWhere ID     This is your Care EveryWhere ID. This could be used by other organizations to access your [place] medical records  [id number]        Your Vitals Were     Pulse                   87            Blood Pressure from Last 3 Encounters:   08/09/18 112/61   02/08/18 132/82   08/10/17 127/76    Weight from Last 3 Encounters:   04/27/05 86.6 kg (191 lb)   04/20/05 86.6 kg (191 lb)   02/04/05 87.4 kg (192 lb 9.6 oz)               Today, you had the following     No orders found for display         Where to get your medicines      These medications were sent to [facility] - [place], [place] - [address] [facility] [address]  [address] [facility] [address], [place] [address]     Phone:  [phone]     escitalopram 10 MG tablet          Primary Care Provider Office Phone # Fax #    Stephan Voss [phone]       LALITA [facility] MEDICAL  [facility] [address]        Equal Access to Services     [facility]: Hadii aad ku hadasho Soomaali, waaxda luqadaha, qaybta kaalmada adeegyada, waxay idiin hayaan adesukhjinder polanco . So [facility] [phone].    ATENCIÓN: Si habla español, tiene a barragan disposición servicios gratuitos de asistencia lingüística. [facility] [phone].    We comply with applicable federal civil rights laws and [place] laws. We do not discriminate on the basis of race, color, national origin, age, disability, sex, sexual orientation, or gender identity.            Thank you!     Thank you for choosing PSYCHIATRY CLINIC  for your care. Our goal is always to provide you with excellent care. Hearing back from our patients is one way we can continue to improve our services. Please take a few minutes to complete the written survey that you may receive in the mail after your visit with us. Thank you!             Your Updated Medication List - Protect others around you: Learn how to safely use, store and throw away your medicines at www.disposemymeds.org.          This list is accurate as of 8/9/18  9:20 AM.  Always use your most recent med list.                   Brand Name Dispense Instructions for use Diagnosis    acetaminophen 500 MG tablet    TYLENOL     Take 1,000 mg by mouth        aspirin 81 MG EC tablet      Take 81 mg by mouth        calcium carbonate 500 MG tablet    OS-MAXIMUS 500 mg [place]. Ca     Take 500 mg by mouth 2 times daily        * cholecalciferol 1000 UNIT  tablet    vitamin D3          * Vitamin D3 2000 units Tabs      Take 2,000 Units by mouth daily        clonazePAM 0.5 MG tablet    klonoPIN    45 tablet    Take 1/2 tab by mouth daily as needed for anxiety.    Agoraphobia with panic disorder, TRUDY (generalized anxiety disorder)       escitalopram 10 MG tablet    LEXAPRO    90 tablet    Take 1 tablet (10 mg) by mouth daily    Agoraphobia with panic disorder       Fish Oil 500 MG Caps      Take 3 capsules (1,500 mg) by mouth daily        glucosamine-chondroitin 500-400 MG Caps per capsule      Take 1 capsule by mouth daily        lisinopril 5 MG tablet    PRINIVIL/ZESTRIL     Take 5 mg by mouth        Melatonin 10 MG Tabs tablet      Take 1 tablet (10 mg) by mouth At Bedtime        Multi-vitamin Tabs tablet      Take 1 tablet by mouth daily        simvastatin 20 MG tablet    ZOCOR     Take 20 mg by mouth        traMADol 50 MG tablet    ULTRAM     Take 50 mg by mouth        * Notice:  This list has 2 medication(s) that are the same as other medications prescribed for you. Read the directions carefully, and ask your doctor or other care provider to review them with you.

## 2018-08-09 NOTE — NURSING NOTE
Chief Complaint   Patient presents with     Recheck Medication     Agoraphobia with panic disorder      Patient refuses weight at Psych. Visits

## 2018-08-26 ENCOUNTER — MYC MEDICAL ADVICE (OUTPATIENT)
Dept: PSYCHIATRY | Facility: CLINIC | Age: 73
End: 2018-08-26

## 2018-08-26 DIAGNOSIS — F40.01 AGORAPHOBIA WITH PANIC DISORDER: ICD-10-CM

## 2018-08-26 DIAGNOSIS — F41.1 GAD (GENERALIZED ANXIETY DISORDER): ICD-10-CM

## 2018-08-27 RX ORDER — CLONAZEPAM 0.5 MG/1
TABLET ORAL
Qty: 45 TABLET | Refills: 0
Start: 2018-08-27 | End: 2019-02-21

## 2018-08-27 NOTE — TELEPHONE ENCOUNTER
Refill Request (Clonazepam )            Patience Walters, APRN CNS   You 5 minutes ago (2:35 PM)                 Ok to refill Klonopin 0.5mg for 45 tablets.   Thank you,   Patience (Routing comment)                  - 45 tab refilled to preferred pharmacy  - Refill phoned into pharmacist Avis   - Med tab changed to reflect this   - No further action needed by the writer

## 2018-08-27 NOTE — TELEPHONE ENCOUNTER
Last seen: 8/9/18  RTC: 6 months   Cancel: none   No-show: none  Next appt: 2/12/18    Incoming refill from patient via ESTmobhart     Medication requested: clonazePAM (KLONOPIN) 0.5 MG tablet  Directions: Take 1/2 tab by mouth daily as needed for anxiety.  Qty: 45  Last refilled: 10/19/17    Will route to provider for approval

## 2019-02-21 ENCOUNTER — OFFICE VISIT (OUTPATIENT)
Dept: PSYCHIATRY | Facility: CLINIC | Age: 74
End: 2019-02-21
Attending: CLINICAL NURSE SPECIALIST
Payer: MEDICARE

## 2019-02-21 VITALS — SYSTOLIC BLOOD PRESSURE: 133 MMHG | HEART RATE: 80 BPM | DIASTOLIC BLOOD PRESSURE: 80 MMHG

## 2019-02-21 DIAGNOSIS — F41.1 GAD (GENERALIZED ANXIETY DISORDER): ICD-10-CM

## 2019-02-21 DIAGNOSIS — F40.01 AGORAPHOBIA WITH PANIC DISORDER: Primary | ICD-10-CM

## 2019-02-21 PROCEDURE — G0463 HOSPITAL OUTPT CLINIC VISIT: HCPCS | Mod: ZF

## 2019-02-21 RX ORDER — CLONAZEPAM 0.5 MG/1
TABLET ORAL
Qty: 45 TABLET | Refills: 0 | Status: SHIPPED | OUTPATIENT
Start: 2019-02-21 | End: 2019-08-22

## 2019-02-21 RX ORDER — ESCITALOPRAM OXALATE 10 MG/1
10 TABLET ORAL DAILY
Qty: 90 TABLET | Refills: 1 | Status: SHIPPED | OUTPATIENT
Start: 2019-02-21 | End: 2019-08-22

## 2019-02-21 ASSESSMENT — PAIN SCALES - GENERAL: PAINLEVEL: SEVERE PAIN (6)

## 2019-02-21 ASSESSMENT — PATIENT HEALTH QUESTIONNAIRE - PHQ9: SUM OF ALL RESPONSES TO PHQ QUESTIONS 1-9: 0

## 2019-02-21 NOTE — PROGRESS NOTES
Outpatient Psychiatry Progress Note     Provider: JAYJAY Shook CNS  Date: 2019  Service:  Medication follow up with counseling.   Patient Identification: Sia Monroe  : 1945   MRN: [id number]    Sia Monroe is a 73 year old year old female who presents for ongoing psychiatric care.  Sia Monroe was last seen in clinic on 18.   At that time,   Assessment & Plan       Sia Monroe is seen today for follow up and reports her mood has been stable and would to continue current medications.     Diagnosis  Panic with Agoraphobia, TRUDY     Plan:  Medication: no changes  OTC Recommendations: none  Lab Orders:  none  Referrals: none  Release of Information: none  Future Treatment Considerations:per symptoms  Return for Follow Up:6 months      ____________________________________________________________________________________________________________________________________________    2019  Today Sia reports she has not been feeling well physically. She fell on 19 and still has pain and soreness. 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  About 6 months ago she ran out of Lexapro for 4 to 5 days and the decided to restart at 5mg but at that dose was dizzy. She was at 5mg for about 5 weeks. She increased the dose to 10mg and it went away after a few days.  She went to see her brother at [place] in [place] and had very high anxiety She didn't have panic attacks but her blood pressure went up but not severe and it was difficult to get under control.  Side effects of medication include: see subjective  Psychiatric Review of Systems:  Depression: In the last 2 weeks per PHQ-9 score:   PHQ-9 SCORE 2019   PHQ-9 Total Score -   PHQ-9 Total Score 0   PHQ-9 Total Score -       Anxiety : using klonopin about 1/2 tablet about 3 times a month.  Currently mildly anxious about being stuck at home.   Nova na   Psychosis  na.   ADHD na    Review of  Medical Systems:  Sleep: stable  Energy: stable  Concentration: stable  Appetite: stable  GI Concerns: none  Cardiac concerns: see subjective  Neurological concerns: see subjective  Other medical concerns: see subjective  Current Substance Use:  Alcohol:denies frequent use or abuse  Other drugs:denies  Caffeine:not reviewed  Nicotine: no change  Past Medical History:   Past Medical History:   Diagnosis Date     Depressive disorder, not elsewhere classified      Headache(784.0)      Patient Active Problem List   Diagnosis     Agoraphobia with panic disorder     TRUDY (generalized anxiety disorder)       Allergies:   Allergies   Allergen Reactions     No Known Drug Allergies      Perfume      Latex Rash          Current Medications     Current Outpatient Medications Ordered in Epic   Medication Sig Dispense Refill     acetaminophen (TYLENOL) 500 MG tablet Take 1,000 mg by mouth       aspirin EC 81 MG EC tablet Take 81 mg by mouth       calcium carbonate (OS-MAXIMUS 500 MG [place]. [place]) 500 MG tablet Take 500 mg by mouth 2 times daily       cholecalciferol (VITAMIN D3) 1000 UNIT tablet        Cholecalciferol (VITAMIN D3) 2000 UNITS TABS Take 2,000 Units by mouth daily       clonazePAM (KLONOPIN) 0.5 MG tablet Take 1/2 tab by mouth daily as needed for anxiety. 45 tablet 0     escitalopram (LEXAPRO) 10 MG tablet Take 1 tablet (10 mg) by mouth daily 90 tablet 1     glucosamine-chondroitin 500-400 MG CAPS Take 1 capsule by mouth daily       lisinopril (PRINIVIL/ZESTRIL) 5 MG tablet Take 5 mg by mouth       Melatonin 10 MG TABS tablet Take 1 tablet (10 mg) by mouth At Bedtime       multivitamin, therapeutic with minerals (MULTI-VITAMIN) TABS Take 1 tablet by mouth daily       Omega-3 Fatty Acids (FISH OIL) 500 MG CAPS Take 3 capsules (1,500 mg) by mouth daily       simvastatin (ZOCOR) 20 MG tablet Take 20 mg by mouth       traMADol (ULTRAM) 50 MG tablet Take 50 mg by mouth       No current Epic-ordered facility-administered  "medications on file.         Mental Status Exam     Appearance:  Casually dressed and Well groomed  Behavior/relationship to examiner/demeanor:  Cooperative  Orientation: Oriented to person, place, time and situation  Psychomotor: normal form  Speech Rate:  Normal  Speech Spontaneity:  Normal  Mood:  \"okay\"  Affect:  Appropriate/mood-congruent  Thought Process (Associations):  Goal directed  Thought Content:  no overt psychosis, denies suicidal ideation, intent or thoughts and patient does not appear to be responding to internal stimuli  Abnormal Perception:  None  Attention/Concentration:  Normal  Insight:  Good  Judgment:  Good      Results     Vital signs: /80   Pulse 80     Laboratory Data:  no new data    Assessment & Plan      Sia Monroe is seen today for follow up and reports in general her mood has been stable and she does not want medication changes at this time.    Diagnosis  Encounter Diagnoses   Name Primary?     Agoraphobia with panic disorder Yes     TRUDY (generalized anxiety disorder)        Plan:  Medication: Continue current medication. Klonopin script given to patient.  OTC Recommendations: none  Lab Orders:  none  Referrals: none  Release of Information: none   Future Treatment Considerations:per symptoms  Return for Follow Up:6 months   The risks, benefits, alternatives and side effects have been discussed and are understood by the patient. The patient understands the risks of using street drugs or alcohol. There are no medical contraindications, the patient agrees to treatment, and has the capacity to do so. The patient understands to call 911 or come to the nearest ED if life threatening or urgent symptoms present.  In addition time was spent counseling the patient and/or coordinating care regarding review of social and occupational functioning.  In addition patient was counseled on health and wellness practices to augment medication treatment of symptoms. See note for " details.    Patience Walters, APRN CNS 2/21/2019

## 2019-08-22 ENCOUNTER — OFFICE VISIT (OUTPATIENT)
Dept: PSYCHIATRY | Facility: CLINIC | Age: 74
End: 2019-08-22
Attending: CLINICAL NURSE SPECIALIST
Payer: MEDICARE

## 2019-08-22 VITALS — SYSTOLIC BLOOD PRESSURE: 128 MMHG | HEART RATE: 80 BPM | DIASTOLIC BLOOD PRESSURE: 80 MMHG

## 2019-08-22 DIAGNOSIS — F41.1 GAD (GENERALIZED ANXIETY DISORDER): ICD-10-CM

## 2019-08-22 DIAGNOSIS — F40.01 AGORAPHOBIA WITH PANIC DISORDER: Primary | ICD-10-CM

## 2019-08-22 PROCEDURE — G0463 HOSPITAL OUTPT CLINIC VISIT: HCPCS | Mod: ZF

## 2019-08-22 RX ORDER — ESCITALOPRAM OXALATE 10 MG/1
10 TABLET ORAL DAILY
Qty: 90 TABLET | Refills: 1 | Status: SHIPPED | OUTPATIENT
Start: 2019-08-22 | End: 2020-02-20

## 2019-08-22 RX ORDER — CLONAZEPAM 0.5 MG/1
TABLET ORAL
Qty: 45 TABLET | Refills: 0 | Status: SHIPPED | OUTPATIENT
Start: 2019-08-22

## 2019-08-22 ASSESSMENT — PATIENT HEALTH QUESTIONNAIRE - PHQ9: SUM OF ALL RESPONSES TO PHQ QUESTIONS 1-9: 0

## 2019-08-22 ASSESSMENT — PAIN SCALES - GENERAL: PAINLEVEL: NO PAIN (0)

## 2019-08-22 NOTE — PROGRESS NOTES
Outpatient Psychiatry Progress Note     Provider: JAYJAY Shook CNS  Date: 2019  Service:  Medication follow up with counseling.   Patient Identification: Sia Monroe  : 1945   MRN: [id number]    Sia Monroe is a 73 year old year old female who presents for ongoing psychiatric care.  Sia Monroe was last seen in clinic on 19.   At that time,   Assessment & Plan       Sia Monroe is seen today for follow up and reports in general her mood has been stable and she does not want medication changes at this time.     Diagnosis       Encounter Diagnoses   Name Primary?     Agoraphobia with panic disorder Yes     TRUDY (generalized anxiety disorder)           Plan:  Medication: Continue current medication. Klonopin script given to patient.  OTC Recommendations: none  Lab Orders:  none  Referrals: none  Release of Information: none   Future Treatment Considerations:per symptoms  Return for Follow Up:6 months      ____________________________________________________________________________________________________________________________________________    2019  Today Sia reports she is feeling ok but a little more panicky which she thinks is due to changes in her glasses prescription and getting use to them.  Still working and this is going well. No long travels.   Has run 3  5k races.  Stress with 1/2 sister who has contacted Sia and her other siblings and she has never met.  Has had contact with her for 2 years and she is coming to visit next week.  Side effects of medication include: none reported  Psychiatric Review of Systems:  Depression: In the last 2 weeks per PHQ-9 score:   PHQ-9 SCORE 2019   PHQ-9 Total Score -   PHQ-9 Total Score 0   PHQ-9 Total Score -       Anxiety : stable, still using Klonopin 0/25mg about 3 times a month.  Nova na   Psychosis  na.   ADHD na    Review of Medical Systems:  Sleep: stable  Energy: stable  Concentration:  stable  Appetite: stable  GI Concerns: none   Cardiac concerns: none  Neurological concerns: none  Other medical concerns: no new concerns  Current Substance Use:  Alcohol:denies  Other drugs:denies  Caffeine:not reviewed  Nicotine: none  Past Medical History:   Past Medical History:   Diagnosis Date     Depressive disorder, not elsewhere classified      Headache(784.0)      Patient Active Problem List   Diagnosis     Agoraphobia with panic disorder     TRUDY (generalized anxiety disorder)       Allergies:   Allergies   Allergen Reactions     No Known Drug Allergies      Perfume      Latex Rash          Current Medications     Current Outpatient Medications Ordered in Epic   Medication Sig Dispense Refill     acetaminophen (TYLENOL) 500 MG tablet Take 1,000 mg by mouth       aspirin EC 81 MG EC tablet Take 81 mg by mouth       calcium carbonate (OS-MAXIMUS 500 MG [place]. [place]) 500 MG tablet Take 500 mg by mouth 2 times daily       cholecalciferol (VITAMIN D3) 1000 UNIT tablet        Cholecalciferol (VITAMIN D3) 2000 UNITS TABS Take 2,000 Units by mouth daily       clonazePAM (KLONOPIN) 0.5 MG tablet Take 1/2 tab by mouth daily as needed for anxiety. 45 tablet 0     escitalopram (LEXAPRO) 10 MG tablet Take 1 tablet (10 mg) by mouth daily 90 tablet 1     glucosamine-chondroitin 500-400 MG CAPS Take 1 capsule by mouth daily       lisinopril (PRINIVIL/ZESTRIL) 5 MG tablet Take 5 mg by mouth       Melatonin 10 MG TABS tablet Take 1 tablet (10 mg) by mouth At Bedtime       multivitamin, therapeutic with minerals (MULTI-VITAMIN) TABS Take 1 tablet by mouth daily       Omega-3 Fatty Acids (FISH OIL) 500 MG CAPS Take 3 capsules (1,500 mg) by mouth daily       simvastatin (ZOCOR) 20 MG tablet Take 20 mg by mouth       traMADol (ULTRAM) 50 MG tablet Take 50 mg by mouth       No current Epic-ordered facility-administered medications on file.         Mental Status Exam     Appearance:  Casually dressed and Well  "groomed  Behavior/relationship to examiner/demeanor:  Cooperative  Orientation: Oriented to person, place, time and situation  Psychomotor: normal form  Speech Rate:  Normal  Speech Spontaneity:  Normal  Mood:  \"okay\"  Affect:  Appropriate/mood-congruent  Thought Process (Associations):  Goal directed  Thought Content:  no overt psychosis, denies suicidal ideation, intent or thoughts and patient does not appear to be responding to internal stimuli  Abnormal Perception:  None  Attention/Concentration:  Normal  Insight: Good  Judgment: Good      Results     Vital signs: /80   Pulse 80     Laboratory Data:  no new data reviewed    Assessment & Plan      Sia Monroe is seen today for follow up and reports mood has been stable. Would like to continue current medication.    Diagnosis  Encounter Diagnoses   Name Primary?     Agoraphobia with panic disorder Yes     TRUDY (generalized anxiety disorder)        Plan:  Medication: Continue current medications  OTC Recommendations: none  Lab Orders:  none  Referrals: none  Release of Information: none  Future Treatment Considerations:per symptoms  Return for Follow Up:6 months   The risks, benefits, alternatives and side effects have been discussed and are understood by the patient. The patient understands the risks of using street drugs or alcohol. There are no medical contraindications, the patient agrees to treatment, and has the capacity to do so. The patient understands to call 911 or come to the nearest ED if life threatening or urgent symptoms present.  In addition time was spent counseling the patient and/or coordinating care regarding review of social and occupational functioning.  In addition patient was counseled on health and wellness practices to augment medication treatment of symptoms. See note for details.    Patience Walters, JAYJAY CNS 8/22/2019   "

## 2019-08-22 NOTE — NURSING NOTE
Chief Complaint   Patient presents with     Recheck Medication     Agoraphobia with panic disorder

## 2019-10-03 ENCOUNTER — HEALTH MAINTENANCE LETTER (OUTPATIENT)
Age: 74
End: 2019-10-03

## 2020-02-08 ENCOUNTER — HEALTH MAINTENANCE LETTER (OUTPATIENT)
Age: 75
End: 2020-02-08

## 2020-02-20 ENCOUNTER — OFFICE VISIT (OUTPATIENT)
Dept: PSYCHIATRY | Facility: CLINIC | Age: 75
End: 2020-02-20
Attending: CLINICAL NURSE SPECIALIST
Payer: MEDICARE

## 2020-02-20 DIAGNOSIS — F41.1 GAD (GENERALIZED ANXIETY DISORDER): Primary | ICD-10-CM

## 2020-02-20 DIAGNOSIS — F40.01 AGORAPHOBIA WITH PANIC DISORDER: ICD-10-CM

## 2020-02-20 RX ORDER — ESCITALOPRAM OXALATE 10 MG/1
10 TABLET ORAL DAILY
Qty: 90 TABLET | Refills: 1 | Status: SHIPPED | OUTPATIENT
Start: 2020-02-20

## 2020-02-20 ASSESSMENT — PATIENT HEALTH QUESTIONNAIRE - PHQ9: SUM OF ALL RESPONSES TO PHQ QUESTIONS 1-9: 1

## 2020-02-20 NOTE — PATIENT INSTRUCTIONS
Thank you for coming to the PSYCHIATRY CLINIC.    Lab Testing:  If you had lab testing today and your results are reassuring or normal they will be mailed to you or sent through [employer] within 7 days.   If the lab tests need quick action we will call you with the results.  The phone number we will call with results is # [phone] (home) [phone] (work). If this is not the best number please call our clinic and change the number.    Medication Refills:  If you need any refills please call your pharmacy and they will contact us. Our fax number for refills is [phone]. Please allow three business for refill processing.   If you need to  your refill at a new pharmacy, please contact the new pharmacy directly. The new pharmacy will help you get your medications transferred.     Scheduling:  If you have any concerns about today's visit or wish to schedule another appointment please call our office during normal business hours [phone] (8-5:00 M-F)    Contact Us:  Please call [phone] during business hours (8-5:00 M-F).  If after clinic hours, or on the weekend, please call  [phone].    Financial Assistance [phone]  [facility] Billing [phone]  [facility] Billing [facility], [facility]: [phone]  [facility] Billing [phone]  Medical Records [phone]      MENTAL HEALTH CRISIS NUMBERS:  [facility]:   [facility] - [phone]   Crisis Residence [facility] - [phone]   Walk-In Counseling [facility] [phone]   COPE 24/7 [place] Mobile Team for Adults - [[phone]]; Child - [[phone]]        [facility]:   [facility] - [phone]   Walk-in counseling [facility] - [phone]   Walk-in counseling [facility] - [phone]   Crisis Residence [facility] - [phone]   Urgent Care Adult Mental Health:   --Drop-in, 24/7 crisis line, and [place] Mobile Team  [[phone]]    CRISIS TEXT LINE: Text 017-775 from anywhere, anytime, any crisis 24/7;    OR SEE www.crisistextline.org     National Suicide Prevention Lifeline: 074-470-ANZW (759-184-5673)    Poison Control Center - 9-478-383-2632    CHILD: [facility] needs assessment team - [phone]     [facility] Lifeline - [phone]; or [facility] Lifeline - 1-933.434.5963    If you have a medical emergency please call 911or go to the nearest ER.                    _____________________________________________    Again thank you for choosing PSYCHIATRY CLINIC and please let us know how we can best partner with you to improve you and your family's health.  You may be receiving a survey regarding this appointment. We would love to have your feedback, both positive and negative. The survey is done by an external company, so your answers are anonymous.

## 2020-02-20 NOTE — PROGRESS NOTES
Outpatient Psychiatry Progress Note     Provider: JAYJAY Shook CNS  Date: 2020  Service:  Medication follow up with counseling.   Patient Identification: Sia Monroe  : 1945   MRN: [id number]    Sia Monroe is a 74 year old year old female who presents for ongoing psychiatric care.  Sia Monroe was last seen in clinic on 19.   At that time,   Assessment & Plan       Sia Monroe is seen today for follow up and reports mood has been stable. Would like to continue current medication.     Diagnosis       Encounter Diagnoses   Name Primary?     Agoraphobia with panic disorder Yes     TRUDY (generalized anxiety disorder)           Plan:  Medication: Continue current medications  OTC Recommendations: none  Lab Orders:  none  Referrals: none  Release of Information: none  Future Treatment Considerations:per symptoms  Return for Follow Up:6 months      ____________________________________________________________________________________________________________________________________________    2020  Today Sia reports she fell on the ice a few weeks and has been having some difficult with stiffness, pain and soreness. 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  Since the last appointment has been more motivated to start travel again.  Side effects of medication include: none  Psychiatric Review of Systems:  Depression: In the last 2 weeks per PHQ-9 score:   PHQ-9 SCORE 2019   PHQ-9 Total Score - - -   PHQ-9 Total Score 0 0 1   PHQ-9 Total Score - - -        Anxiety : stable  Nova na   Psychosis  na.   ADHD na    Review of Medical Systems:  Sleep: stable  Energy: mild  Concentration: stable  Appetite: stable  GI Concerns: none  Cardiac concerns: none  Neurological concerns: see subjective  Other medical concerns: see subjective  Current Substance Use:  Alcohol:denies frequent use or abuse  Other  drugs:none  Caffeine:not reviewed  Nicotine: none  Past Medical History:   Past Medical History:   Diagnosis Date     Depressive disorder, not elsewhere classified      Headache(784.0)      Patient Active Problem List   Diagnosis     Agoraphobia with panic disorder     TRUDY (generalized anxiety disorder)       Allergies:   Allergies   Allergen Reactions     No Known Drug Allergies      Perfume      Latex Rash          Current Medications     Current Outpatient Medications Ordered in Epic   Medication Sig Dispense Refill     acetaminophen (TYLENOL) 500 MG tablet Take 1,000 mg by mouth       aspirin EC 81 MG EC tablet Take 81 mg by mouth       calcium carbonate (OS-MAXIMUS 500 MG [place]. [place]) 500 MG tablet Take 500 mg by mouth 2 times daily       cholecalciferol (VITAMIN D3) 1000 UNIT tablet        Cholecalciferol (VITAMIN D3) 2000 UNITS TABS Take 2,000 Units by mouth daily       clonazePAM (KLONOPIN) 0.5 MG tablet Take 1/2 tab by mouth daily as needed for anxiety. 45 tablet 0     escitalopram (LEXAPRO) 10 MG tablet Take 1 tablet (10 mg) by mouth daily 90 tablet 1     glucosamine-chondroitin 500-400 MG CAPS Take 1 capsule by mouth daily       lisinopril (PRINIVIL/ZESTRIL) 5 MG tablet Take 5 mg by mouth       Melatonin 10 MG TABS tablet Take 1 tablet (10 mg) by mouth At Bedtime       multivitamin, therapeutic with minerals (MULTI-VITAMIN) TABS Take 1 tablet by mouth daily       Omega-3 Fatty Acids (FISH OIL) 500 MG CAPS Take 3 capsules (1,500 mg) by mouth daily       simvastatin (ZOCOR) 20 MG tablet Take 20 mg by mouth       traMADol (ULTRAM) 50 MG tablet Take 50 mg by mouth       No current Epic-ordered facility-administered medications on file.         Mental Status Exam     Appearance:  Casually dressed and Well groomed  Behavior/relationship to examiner/demeanor:  Cooperative  Orientation: Oriented to person, place, time and situation  Psychomotor: normal form  Speech Rate:  Normal  Speech Spontaneity:  Normal  Mood:   "\"okay\"  Affect:  Appropriate/mood-congruent  Thought Process (Associations):  Goal directed  Thought Content:  no overt psychosis, denies suicidal ideation, intent or thoughts and patient does not appear to be responding to internal stimuli  Abnormal Perception:  None  Attention/Concentration:  Normal  Insight:  Good  Judgment:  Good      Results     Vital signs: There were no vitals taken for this visit.    Laboratory Data:  reviewed from other providers. No concerns with psych meds    Assessment & Plan      Sia Monroe is seen today for follow up and reports overall her mood is ok except due to recovery from recent fall on the ice. Agrees with plan to continue current medications.    Diagnosis  Encounter Diagnoses   Name Primary?     TRUDY (generalized anxiety disorder) Yes     Agoraphobia with panic disorder        Plan:  Medication: Continued current medication  OTC Recommendations: none  Lab Orders:  none  Referrals: none  Release of Information: per symptoms  Future Treatment Considerations:per symptoms  Return for Follow Up:6 months   The risks, benefits, alternatives and side effects have been discussed and are understood by the patient. The patient understands the risks of using street drugs or alcohol. There are no medical contraindications, the patient agrees to treatment, and has the capacity to do so. The patient understands to call 911 or come to the nearest ED if life threatening or urgent symptoms present.  In addition time was spent counseling the patient and/or coordinating care regarding review of social and occupational functioning.  In addition patient was counseled on health and wellness practices to augment medication treatment of symptoms. See note for details.    Patience Walters, APRN CNS 2/20/2020   "

## 2020-09-09 DIAGNOSIS — F40.01 AGORAPHOBIA WITH PANIC DISORDER: ICD-10-CM

## 2020-09-11 NOTE — TELEPHONE ENCOUNTER
Medication requested: escitalopram (LEXAPRO) 10 MG tablet   Last refilled: 2/20/20  Qty: 90/1      Last seen: 2/20/20  RTC: 6 months  Cancel: 0  No-show: 0  Next appt: 0    Refill decision:   Refill pended and routed to the clinic RN for review/determination due to   L Romeo is no longer a provider at [facility]  Please provide a follow-up plan for Judiths refill

## 2020-09-15 RX ORDER — ESCITALOPRAM OXALATE 10 MG/1
10 TABLET ORAL DAILY
Qty: 90 TABLET | Refills: 1 | OUTPATIENT
Start: 2020-09-15

## 2020-09-15 NOTE — TELEPHONE ENCOUNTER
Brigid Aparicio MD  You; Cindy Rivers, VIVIAN; Sparkle Doan, [facility]; Kelsi Urbina, [facility]; Kelsi Garcia, [facility]; Luis Pyle MD; Erlin Holden MD 12 hours ago (8:48 PM)       To me, it looks like [facility] just refilled the Lexapro.   On 9/11.   And if she still needs it, for sure she cannot get 90days + a refill.   Looks like she is in a senior clinic in that system.   I am thinking she could follow with them, they can manage this in that clinic OR they have mental health services she can transfer to.     Can we get one of our outreach teams to talk to this pt?   Olga White Becca or Kelsi? Or perhaps you can super sleuth a solution?   Someone needs to talk to the pt and find out what her options are. Ideally she would go out of our system since she has other care at [facility], she should be seen there for mental health as well.   It might mean talking to PCP to see if that person can accept care.   Meanwhile, she can go into any next transfer slot of the APRNs--in case the pt doesn't want to leave.   Thanks.   Did not refill.     If this does not resolve tomorrow, you can discuss with Erlin or Sabas who are covering my medical director duties

## 2020-09-15 NOTE — TELEPHONE ENCOUNTER
Kelsi Urbina, [facility]  Erlin Adler RN; Brigid Aparicio MD; Cindy Rivers RN; Sparkle Doan, [facility]; Kelsi Garcia, [facility]; 3 others    Caller: Unspecified (6 days ago,  2:48 PM)               Hi team,     I just spoke with Sia, and she clarified that there was a switch with her (I think) health insurance, and the medication request to us was generated automatically, not initiated by the patient. Sia is planning on continuing med management with her PCP and is not interested in scheduling an appointment with us. The medication refill request can be denied.     Thanks!     Marina

## 2020-11-07 ENCOUNTER — HEALTH MAINTENANCE LETTER (OUTPATIENT)
Age: 75
End: 2020-11-07

## 2021-03-27 ENCOUNTER — HEALTH MAINTENANCE LETTER (OUTPATIENT)
Age: 76
End: 2021-03-27

## 2021-09-05 ENCOUNTER — HEALTH MAINTENANCE LETTER (OUTPATIENT)
Age: 76
End: 2021-09-05

## 2021-12-26 ENCOUNTER — HEALTH MAINTENANCE LETTER (OUTPATIENT)
Age: 76
End: 2021-12-26

## 2022-04-17 ENCOUNTER — HEALTH MAINTENANCE LETTER (OUTPATIENT)
Age: 77
End: 2022-04-17

## 2022-10-29 ENCOUNTER — HEALTH MAINTENANCE LETTER (OUTPATIENT)
Age: 77
End: 2022-10-29

## 2023-04-02 ENCOUNTER — HEALTH MAINTENANCE LETTER (OUTPATIENT)
Age: 78
End: 2023-04-02

## 2023-06-01 ENCOUNTER — HEALTH MAINTENANCE LETTER (OUTPATIENT)
Age: 78
End: 2023-06-01